# Patient Record
Sex: FEMALE | Race: OTHER | URBAN - METROPOLITAN AREA
[De-identification: names, ages, dates, MRNs, and addresses within clinical notes are randomized per-mention and may not be internally consistent; named-entity substitution may affect disease eponyms.]

---

## 2023-02-13 ENCOUNTER — OFFICE VISIT (OUTPATIENT)
Dept: FAMILY MEDICINE CLINIC | Age: 33
End: 2023-02-13

## 2023-02-13 ENCOUNTER — HOSPITAL ENCOUNTER (OUTPATIENT)
Dept: LAB | Age: 33
Discharge: HOME | End: 2023-02-13

## 2023-02-13 VITALS
BODY MASS INDEX: 33.47 KG/M2 | DIASTOLIC BLOOD PRESSURE: 85 MMHG | HEART RATE: 72 BPM | WEIGHT: 166 LBS | TEMPERATURE: 97.9 F | OXYGEN SATURATION: 99 % | HEIGHT: 59 IN | SYSTOLIC BLOOD PRESSURE: 129 MMHG

## 2023-02-13 DIAGNOSIS — E66.3 OVERWEIGHT: ICD-10-CM

## 2023-02-13 DIAGNOSIS — R68.84 JAW PAIN: Primary | ICD-10-CM

## 2023-02-13 DIAGNOSIS — Z13.220 SCREENING FOR CHOLESTEROL LEVEL: ICD-10-CM

## 2023-02-13 PROCEDURE — 36415 COLL VENOUS BLD VENIPUNCTURE: CPT

## 2023-02-13 PROCEDURE — 80061 LIPID PANEL: CPT

## 2023-02-13 PROCEDURE — 99203 OFFICE O/P NEW LOW 30 MIN: CPT | Performed by: PHYSICIAN ASSISTANT

## 2023-02-13 PROCEDURE — 80053 COMPREHEN METABOLIC PANEL: CPT

## 2023-02-13 PROCEDURE — 83036 HEMOGLOBIN GLYCOSYLATED A1C: CPT

## 2023-02-13 PROCEDURE — 84443 ASSAY THYROID STIM HORMONE: CPT

## 2023-02-13 NOTE — PROGRESS NOTES
Coordination of Care  1. Have you been to the ER, urgent care clinic since your last visit? Hospitalized since your last visit? No    2. Have you seen or consulted any other health care providers outside of the [address] [place] [address] since your last visit? Include any pap smears or colon screening. No    Does the patient need refills? NO    Learning Assessment Complete?  yes

## 2023-02-13 NOTE — PROGRESS NOTES
Name and  confirmed w/ patient. An After Visit Summary was provided and all discharge instructions were reviewed with the patient including: dental resources, labs. Pt provided with number of a private, [place] speaking dental practice as another option if she so chooses. Time for questions and answers provided, patient verbalized understanding. Patient discharged from clinic in stable condition. LAY  [facility] assisted with interpretation.

## 2023-02-13 NOTE — PROGRESS NOTES
Assessment/Plan:    Diagnoses and all orders for this visit:    1. Jaw pain  -     REFERRAL TO DENTISTRY    2. Screening for cholesterol level  -     LIPID PANEL; Future    3. Overweight  -     LIPID PANEL; Future  -     METABOLIC PANEL, COMPREHENSIVE; Future  -     HEMOGLOBIN A1C WITH EAG; Future  -     TSH 3RD GENERATION; Future        dental resource list to be given, suspect that this might be a dental problem or TMJ that has been quieted a bit with the [address], VIPUL Dai [address] [facility] expressed understanding of this plan. An AVS was printed and given to the patient.      ----------------------------------------------------------------------    Chief Complaint   Patient presents with    Headache     Follow up    Constipation       History of Present Illness:  Pt presents for right jaw angle pain, ongoing for several weeks but had happened in the past too. No sensitive or painful teeth she states. She is taking the mobic for posterior head ache and both her headache and jaw pain seem improved with the medication. She came fasting today in order to have labs  She is g0, she is in a safe relationship with her female partner. She has never had penetrative intercourse (we discussed her risk for HPV is low and she has never had a pap exam)        No past medical history on file. Current Outpatient Medications   Medication Sig Dispense Refill    meloxicam (MOBIC) 15 mg tablet Take 1 Tablet by mouth daily. Para nayeli foster. 14 Tablet 0       No Known Allergies    Social History     Tobacco Use    Smoking status: Never    Smokeless tobacco: Never   Substance Use Topics    Alcohol use: Never    Drug use: Never       No family history on file.     Physical Exam:     Visit Vitals  /85   Pulse 72   Temp 97.9 °F (36.6 °C) (Temporal)   Ht 4' 11.06\" (1.5 m)   Wt 166 lb (75.3 kg)   LMP 01/31/2023   SpO2 99%   BMI 33.47 kg/m²       A&Ox3  WDWN NAD  Respirations normal and non labored  Neuro - CN 2-12 grossly intact  Can not reproduce pain on exam today either on posterior skull or TMJ area.  Teeth do have varying degrees of caries and fillings but no obvious acute infection present

## 2023-02-14 LAB
ALBUMIN SERPL-MCNC: 4.5 G/DL (ref 3.5–5)
ALBUMIN/GLOB SERPL: 1.3 (ref 1.1–2.2)
ALP SERPL-CCNC: 98 U/L (ref 45–117)
ALT SERPL-CCNC: 31 U/L (ref 12–78)
ANION GAP SERPL CALC-SCNC: 5 MMOL/L (ref 5–15)
AST SERPL-CCNC: 17 U/L (ref 15–37)
BILIRUB SERPL-MCNC: 0.5 MG/DL (ref 0.2–1)
BUN SERPL-MCNC: 11 MG/DL (ref 6–20)
BUN/CREAT SERPL: 22 (ref 12–20)
CALCIUM SERPL-MCNC: 10 MG/DL (ref 8.5–10.1)
CHLORIDE SERPL-SCNC: 106 MMOL/L (ref 97–108)
CHOLEST SERPL-MCNC: 215 MG/DL
CO2 SERPL-SCNC: 27 MMOL/L (ref 21–32)
CREAT SERPL-MCNC: 0.51 MG/DL (ref 0.55–1.02)
EST. AVERAGE GLUCOSE BLD GHB EST-MCNC: 103 MG/DL
GLOBULIN SER CALC-MCNC: 3.6 G/DL (ref 2–4)
GLUCOSE SERPL-MCNC: 110 MG/DL (ref 65–100)
HBA1C MFR BLD: 5.2 % (ref 4–5.6)
HDLC SERPL-MCNC: 50 MG/DL
HDLC SERPL: 4.3 (ref 0–5)
LDLC SERPL CALC-MCNC: 105.2 MG/DL (ref 0–100)
POTASSIUM SERPL-SCNC: 4.6 MMOL/L (ref 3.5–5.1)
PROT SERPL-MCNC: 8.1 G/DL (ref 6.4–8.2)
SODIUM SERPL-SCNC: 138 MMOL/L (ref 136–145)
TRIGL SERPL-MCNC: 299 MG/DL (ref ?–150)
TSH SERPL DL<=0.05 MIU/L-ACNC: 1.85 UIU/ML (ref 0.36–3.74)
VLDLC SERPL CALC-MCNC: 59.8 MG/DL

## 2023-02-14 NOTE — PROGRESS NOTES
Please offer a vv to discuss the results of her labs. Thank you   DM test neg, thyroid test neg. Lipid panel elevated.

## 2023-02-17 ENCOUNTER — TELEPHONE (OUTPATIENT)
Dept: FAMILY MEDICINE CLINIC | Age: 33
End: 2023-02-17

## 2023-02-17 NOTE — TELEPHONE ENCOUNTER
PHONE CALL MADE TO PATIENT. NAME AND  VERIFIED. THE LATEST LAB RESULTS WERE REVIEWED WITH THE PATIENT AS WELL AS PCP'S RECOMMENDATIONS. REMINDER GIVEN TO PATIENT IN REGARDS TO HER UPCOMING VV APPT 2023. PATIENT VERBALIZED UNDERSTANDING. CALL WAS ALSO MADE IN REGARD TO DENTAL REFERRAL. DENTAL RESOURCES WERE GIVEN TO PATIENT LAST APPT. PT HAS NOT YET SET UP AN APPT WITH A DENTAL OFFICE OF HER CHOICE.      Alejandra Dickinson RN

## 2023-02-21 ENCOUNTER — VIRTUAL VISIT (OUTPATIENT)
Dept: FAMILY MEDICINE CLINIC | Age: 33
End: 2023-02-21

## 2023-02-21 DIAGNOSIS — R10.13 EPIGASTRIC PAIN: ICD-10-CM

## 2023-02-21 DIAGNOSIS — G44.219 EPISODIC TENSION-TYPE HEADACHE, NOT INTRACTABLE: Primary | ICD-10-CM

## 2023-02-21 PROCEDURE — 99442 PR PHYS/QHP TELEPHONE EVALUATION 11-20 MIN: CPT | Performed by: FAMILY MEDICINE

## 2023-02-21 NOTE — PROGRESS NOTES
Deyci [address] (: 1990) is a 29 y.o. female, established patient, Virtual Visit for evaluation of the following chief complaint(s):   Results (Test result)       ASSESSMENT/PLAN:  1. Episodic tension-type headache, not intractable  Improved. 2. Epigastric pain  She may continue with Lansoprazole OTC PRN if it is infrequent. Discussed re-evaluation if sx become more persistent. No follow-ups on file. SUBJECTIVE/OBJECTIVE:  VV for lab review. Headaches:  Mobic has helped with headaches. Has appt with dentist.  Gastritis:  Dx 5 years ago. Has days with epigastric pain. Worse with certain foods (cabbage) or larger meals. Takes Lansoprazole PRN with good relief (less than weekly)    Review of Systems     Patient-Reported LMP: 23    Physical Exam    On this date 2023 I have spent 11 minutes reviewing previous notes, test results and face to face (virtual) with the patient discussing the diagnosis and importance of compliance with the treatment plan as well as documenting on the day of the visit. Suhas Henderson, was evaluated through a synchronous (real-time) audio-video encounter. The patient (or guardian if applicable) is aware that this is a billable service, which includes applicable co-pays. This Virtual Visit was conducted with patient's (and/or legal guardian's) consent. The visit was conducted pursuant to the emergency declaration under the [address] [place] [address], [address] [place] [address] authority and the [employer] General Act. Patient identification was verified, and a caregiver was present when appropriate. Patient identification was verified at the start of the visit: YES    Services were provided through a phone synchronous discussion virtually to substitute for in-person clinic visit. Patient was located at home and provider was located in office or at home.      An electronic signature was used to authenticate this note.   -- Colton Perry MD

## 2023-02-21 NOTE — PROGRESS NOTES
Coordination of Care  1. Have you been to the ER, urgent care clinic since your last visit? Hospitalized since your last visit? No    2. Have you seen or consulted any other health care providers outside of the [address] [place] [address] since your last visit? Include any pap smears or colon screening. No    Does the patient need refills? N/A    Learning Assessment Complete?  yes